# Patient Record
Sex: FEMALE | Race: WHITE | NOT HISPANIC OR LATINO | URBAN - METROPOLITAN AREA
[De-identification: names, ages, dates, MRNs, and addresses within clinical notes are randomized per-mention and may not be internally consistent; named-entity substitution may affect disease eponyms.]

---

## 2018-03-06 ENCOUNTER — OFFICE (OUTPATIENT)
Dept: URBAN - METROPOLITAN AREA CLINIC 10 | Facility: CLINIC | Age: 68
End: 2018-03-06

## 2018-03-06 ENCOUNTER — OFFICE (OUTPATIENT)
Dept: URBAN - METROPOLITAN AREA CLINIC 10 | Facility: CLINIC | Age: 68
End: 2018-03-06
Payer: COMMERCIAL

## 2018-03-06 VITALS
DIASTOLIC BLOOD PRESSURE: 73 MMHG | HEIGHT: 61 IN | WEIGHT: 148 LBS | SYSTOLIC BLOOD PRESSURE: 131 MMHG | HEART RATE: 78 BPM

## 2018-03-06 DIAGNOSIS — K64.8 OTHER HEMORRHOIDS: ICD-10-CM

## 2018-03-06 DIAGNOSIS — K62.89 OTHER SPECIFIED DISEASES OF ANUS AND RECTUM: ICD-10-CM

## 2018-03-06 DIAGNOSIS — K64.4 RESIDUAL HEMORRHOIDAL SKIN TAGS: ICD-10-CM

## 2018-03-06 DIAGNOSIS — R19.7 DIARRHEA, UNSPECIFIED: ICD-10-CM

## 2018-03-06 LAB
CBC, PLATELET, NO DIFFERENTIAL: HEMATOCRIT: 43.2 % (ref 34–46.6)
CBC, PLATELET, NO DIFFERENTIAL: HEMOGLOBIN: 14.5 G/DL (ref 11.1–15.9)
CBC, PLATELET, NO DIFFERENTIAL: MCH: 28.4 PG (ref 26.6–33)
CBC, PLATELET, NO DIFFERENTIAL: MCHC: 33.6 G/DL (ref 31.5–35.7)
CBC, PLATELET, NO DIFFERENTIAL: MCV: 85 FL (ref 79–97)
CBC, PLATELET, NO DIFFERENTIAL: PLATELETS: 278 X10E3/UL (ref 150–379)
CBC, PLATELET, NO DIFFERENTIAL: RBC: 5.1 X10E6/UL (ref 3.77–5.28)
CBC, PLATELET, NO DIFFERENTIAL: RDW: 14 % (ref 12.3–15.4)
CBC, PLATELET, NO DIFFERENTIAL: WBC: 10.3 X10E3/UL (ref 3.4–10.8)
COMP. METABOLIC PANEL (14): A/G RATIO: 1.8 (ref 1.2–2.2)
COMP. METABOLIC PANEL (14): ALBUMIN, SERUM: 4.2 G/DL (ref 3.6–4.8)
COMP. METABOLIC PANEL (14): ALKALINE PHOSPHATASE, S: 69 IU/L (ref 39–117)
COMP. METABOLIC PANEL (14): ALT (SGPT): 9 IU/L (ref 0–32)
COMP. METABOLIC PANEL (14): AST (SGOT): 16 IU/L (ref 0–40)
COMP. METABOLIC PANEL (14): BILIRUBIN, TOTAL: 0.3 MG/DL (ref 0–1.2)
COMP. METABOLIC PANEL (14): BUN/CREATININE RATIO: 16 (ref 12–28)
COMP. METABOLIC PANEL (14): BUN: 14 MG/DL (ref 8–27)
COMP. METABOLIC PANEL (14): CALCIUM, SERUM: 9.3 MG/DL (ref 8.7–10.3)
COMP. METABOLIC PANEL (14): CARBON DIOXIDE, TOTAL: 27 MMOL/L (ref 18–29)
COMP. METABOLIC PANEL (14): CHLORIDE, SERUM: 104 MMOL/L (ref 96–106)
COMP. METABOLIC PANEL (14): CREATININE, SERUM: 0.86 MG/DL (ref 0.57–1)
COMP. METABOLIC PANEL (14): EGFR IF AFRICN AM: 81 ML/MIN/1.73 (ref 59–?)
COMP. METABOLIC PANEL (14): EGFR IF NONAFRICN AM: 70 ML/MIN/1.73 (ref 59–?)
COMP. METABOLIC PANEL (14): GLOBULIN, TOTAL: 2.3 G/DL (ref 1.5–4.5)
COMP. METABOLIC PANEL (14): GLUCOSE, SERUM: 84 MG/DL (ref 65–99)
COMP. METABOLIC PANEL (14): POTASSIUM, SERUM: 4.8 MMOL/L (ref 3.5–5.2)
COMP. METABOLIC PANEL (14): PROTEIN, TOTAL, SERUM: 6.5 G/DL (ref 6–8.5)
COMP. METABOLIC PANEL (14): SODIUM, SERUM: 147 MMOL/L — HIGH (ref 134–144)
GI PROFILE, STOOL, PCR: ADENOVIRUS F 40/41: NOT DETECTED
GI PROFILE, STOOL, PCR: ASTROVIRUS: NOT DETECTED
GI PROFILE, STOOL, PCR: C DIFFICILE TOXIN A/B: NOT DETECTED
GI PROFILE, STOOL, PCR: CAMPYLOBACTER: NOT DETECTED
GI PROFILE, STOOL, PCR: CRYPTOSPORIDIUM: NOT DETECTED
GI PROFILE, STOOL, PCR: CYCLOSPORA CAYETANENSIS: NOT DETECTED
GI PROFILE, STOOL, PCR: E COLI O157: (no result)
GI PROFILE, STOOL, PCR: ENTAMOEBA HISTOLYTICA: NOT DETECTED
GI PROFILE, STOOL, PCR: ENTEROAGGREGATIVE E COLI: NOT DETECTED
GI PROFILE, STOOL, PCR: ENTEROPATHOGENIC E COLI: NOT DETECTED
GI PROFILE, STOOL, PCR: ENTEROTOXIGENIC E COLI: NOT DETECTED
GI PROFILE, STOOL, PCR: GIARDIA LAMBLIA: NOT DETECTED
GI PROFILE, STOOL, PCR: NOROVIRUS GI/GII: NOT DETECTED
GI PROFILE, STOOL, PCR: PLESIOMONAS SHIGELLOIDES: NOT DETECTED
GI PROFILE, STOOL, PCR: ROTAVIRUS A: NOT DETECTED
GI PROFILE, STOOL, PCR: SALMONELLA: NOT DETECTED
GI PROFILE, STOOL, PCR: SAPOVIRUS: NOT DETECTED
GI PROFILE, STOOL, PCR: SHIGA-TOXIN-PRODUCING E COLI: NOT DETECTED
GI PROFILE, STOOL, PCR: SHIGELLA/ENTEROINVASIVE E COLI: NOT DETECTED
GI PROFILE, STOOL, PCR: VIBRIO CHOLERAE: NOT DETECTED
GI PROFILE, STOOL, PCR: VIBRIO: NOT DETECTED
GI PROFILE, STOOL, PCR: YERSINIA ENTEROCOLITICA: NOT DETECTED
SEDIMENTATION RATE-WESTERGREN: 2 MM/HR (ref 0–40)

## 2018-03-06 PROCEDURE — 46600 DIAGNOSTIC ANOSCOPY SPX: CPT | Performed by: INTERNAL MEDICINE

## 2018-03-06 PROCEDURE — 99214 OFFICE O/P EST MOD 30 MIN: CPT | Mod: 25 | Performed by: INTERNAL MEDICINE

## 2018-03-06 RX ORDER — PRAMOXINE HYDROCHLORIDE HYDROCORTISONE ACETATE 100; 100 MG/10G; MG/10G
AEROSOL, FOAM TOPICAL
Qty: 21 | Refills: 1 | Status: ACTIVE
Start: 2018-03-06

## 2018-03-06 RX ORDER — POLYETHYLENE GLYCOL 3350, SODIUM SULFATE ANHYDROUS, SODIUM BICARBONATE, SODIUM CHLORIDE, POTASSIUM CHLORIDE 236; 22.74; 6.74; 5.86; 2.97 G/4L; G/4L; G/4L; G/4L; G/4L
POWDER, FOR SOLUTION ORAL
Qty: 1 | Refills: 0 | Status: COMPLETED
Start: 2018-03-06 | End: 2018-03-20

## 2018-03-20 ENCOUNTER — AMBULATORY SURGICAL CENTER (OUTPATIENT)
Dept: URBAN - METROPOLITAN AREA SURGERY 1 | Facility: SURGERY | Age: 68
End: 2018-03-20
Payer: COMMERCIAL

## 2018-03-20 ENCOUNTER — OFFICE (OUTPATIENT)
Dept: URBAN - METROPOLITAN AREA PATHOLOGY 22 | Facility: PATHOLOGY | Age: 68
End: 2018-03-20
Payer: COMMERCIAL

## 2018-03-20 VITALS
HEART RATE: 80 BPM | WEIGHT: 142 LBS | DIASTOLIC BLOOD PRESSURE: 82 MMHG | SYSTOLIC BLOOD PRESSURE: 153 MMHG | DIASTOLIC BLOOD PRESSURE: 56 MMHG | WEIGHT: 142 LBS | HEIGHT: 61 IN | SYSTOLIC BLOOD PRESSURE: 119 MMHG | HEART RATE: 76 BPM | OXYGEN SATURATION: 98 % | HEART RATE: 91 BPM | HEART RATE: 73 BPM | HEART RATE: 73 BPM | SYSTOLIC BLOOD PRESSURE: 153 MMHG | SYSTOLIC BLOOD PRESSURE: 133 MMHG | DIASTOLIC BLOOD PRESSURE: 77 MMHG | OXYGEN SATURATION: 98 % | RESPIRATION RATE: 16 BRPM | OXYGEN SATURATION: 96 % | DIASTOLIC BLOOD PRESSURE: 56 MMHG | DIASTOLIC BLOOD PRESSURE: 77 MMHG | OXYGEN SATURATION: 96 % | HEART RATE: 80 BPM | TEMPERATURE: 97.8 F | TEMPERATURE: 98.8 F | HEIGHT: 61 IN | SYSTOLIC BLOOD PRESSURE: 133 MMHG | HEART RATE: 74 BPM | RESPIRATION RATE: 18 BRPM | DIASTOLIC BLOOD PRESSURE: 82 MMHG | DIASTOLIC BLOOD PRESSURE: 76 MMHG | TEMPERATURE: 98.8 F | RESPIRATION RATE: 16 BRPM | DIASTOLIC BLOOD PRESSURE: 76 MMHG | SYSTOLIC BLOOD PRESSURE: 152 MMHG | SYSTOLIC BLOOD PRESSURE: 119 MMHG | HEART RATE: 74 BPM | SYSTOLIC BLOOD PRESSURE: 152 MMHG | HEART RATE: 91 BPM | HEART RATE: 76 BPM | RESPIRATION RATE: 18 BRPM | TEMPERATURE: 97.8 F

## 2018-03-20 DIAGNOSIS — R93.3 ABNORMAL FINDINGS ON DIAGNOSTIC IMAGING OF OTHER PARTS OF DI: ICD-10-CM

## 2018-03-20 DIAGNOSIS — K92.2 GASTROINTESTINAL HEMORRHAGE, UNSPECIFIED: ICD-10-CM

## 2018-03-20 DIAGNOSIS — K57.30 DIVERTICULOSIS OF LARGE INTESTINE WITHOUT PERFORATION OR ABS: ICD-10-CM

## 2018-03-20 DIAGNOSIS — K63.5 POLYP OF COLON: ICD-10-CM

## 2018-03-20 DIAGNOSIS — K52.9 NONINFECTIVE GASTROENTERITIS AND COLITIS, UNSPECIFIED: ICD-10-CM

## 2018-03-20 PROBLEM — D12.5 BENIGN NEOPLASM OF SIGMOID COLON: Status: ACTIVE | Noted: 2018-03-20

## 2018-03-20 PROBLEM — D12.3 BENIGN NEOPLASM OF TRANSVERSE COLON: Status: ACTIVE | Noted: 2018-03-20

## 2018-03-20 PROCEDURE — 45380 COLONOSCOPY AND BIOPSY: CPT | Performed by: INTERNAL MEDICINE

## 2018-03-20 PROCEDURE — 88342 IMHCHEM/IMCYTCHM 1ST ANTB: CPT | Performed by: INTERNAL MEDICINE

## 2018-03-20 PROCEDURE — 88305 TISSUE EXAM BY PATHOLOGIST: CPT | Performed by: INTERNAL MEDICINE

## 2018-03-20 PROCEDURE — G8907 PT DOC NO EVENTS ON DISCHARG: HCPCS | Performed by: INTERNAL MEDICINE

## 2018-03-20 PROCEDURE — G8918 PT W/O PREOP ORDER IV AB PRO: HCPCS | Performed by: INTERNAL MEDICINE

## 2018-03-20 RX ORDER — MESALAMINE 375 MG/1
CAPSULE, EXTENDED RELEASE ORAL
Qty: 120 | Refills: 6 | Status: ACTIVE
Start: 2018-03-20

## 2018-03-28 ENCOUNTER — OFFICE (OUTPATIENT)
Dept: URBAN - METROPOLITAN AREA CLINIC 10 | Facility: CLINIC | Age: 68
End: 2018-03-28
Payer: COMMERCIAL

## 2018-03-28 VITALS
HEIGHT: 61 IN | WEIGHT: 143 LBS | DIASTOLIC BLOOD PRESSURE: 70 MMHG | SYSTOLIC BLOOD PRESSURE: 118 MMHG | HEART RATE: 100 BPM

## 2018-03-28 DIAGNOSIS — K51.311 ULCERATIVE (CHRONIC) RECTOSIGMOIDITIS WITH RECTAL BLEEDING: ICD-10-CM

## 2018-03-28 DIAGNOSIS — A09 INFECTIOUS GASTROENTERITIS AND COLITIS, UNSPECIFIED: ICD-10-CM

## 2018-03-28 LAB
C-REACTIVE PROTEIN, QUANT: 83.4 MG/L — HIGH (ref 0–4.9)
CBC WITH DIFFERENTIAL/PLATELET: BASO (ABSOLUTE): 0 X10E3/UL (ref 0–0.2)
CBC WITH DIFFERENTIAL/PLATELET: BASOS: 0 %
CBC WITH DIFFERENTIAL/PLATELET: EOS (ABSOLUTE): 0.4 X10E3/UL (ref 0–0.4)
CBC WITH DIFFERENTIAL/PLATELET: EOS: 5 %
CBC WITH DIFFERENTIAL/PLATELET: HEMATOCRIT: 41.5 % (ref 34–46.6)
CBC WITH DIFFERENTIAL/PLATELET: HEMOGLOBIN: 13.9 G/DL (ref 11.1–15.9)
CBC WITH DIFFERENTIAL/PLATELET: IMMATURE GRANS (ABS): 0 X10E3/UL (ref 0–0.1)
CBC WITH DIFFERENTIAL/PLATELET: IMMATURE GRANULOCYTES: 0 %
CBC WITH DIFFERENTIAL/PLATELET: LYMPHS (ABSOLUTE): 0.7 X10E3/UL (ref 0.7–3.1)
CBC WITH DIFFERENTIAL/PLATELET: LYMPHS: 9 %
CBC WITH DIFFERENTIAL/PLATELET: MCH: 27.7 PG (ref 26.6–33)
CBC WITH DIFFERENTIAL/PLATELET: MCHC: 33.5 G/DL (ref 31.5–35.7)
CBC WITH DIFFERENTIAL/PLATELET: MCV: 83 FL (ref 79–97)
CBC WITH DIFFERENTIAL/PLATELET: MONOCYTES(ABSOLUTE): 1 X10E3/UL — HIGH (ref 0.1–0.9)
CBC WITH DIFFERENTIAL/PLATELET: MONOCYTES: 14 %
CBC WITH DIFFERENTIAL/PLATELET: NEUTROPHILS (ABSOLUTE): 5.2 X10E3/UL (ref 1.4–7)
CBC WITH DIFFERENTIAL/PLATELET: NEUTROPHILS: 72 %
CBC WITH DIFFERENTIAL/PLATELET: PLATELETS: 253 X10E3/UL (ref 150–379)
CBC WITH DIFFERENTIAL/PLATELET: RBC: 5.01 X10E6/UL (ref 3.77–5.28)
CBC WITH DIFFERENTIAL/PLATELET: RDW: 14.5 % (ref 12.3–15.4)
CBC WITH DIFFERENTIAL/PLATELET: WBC: 7.3 X10E3/UL (ref 3.4–10.8)

## 2018-03-28 PROCEDURE — 99214 OFFICE O/P EST MOD 30 MIN: CPT | Performed by: INTERNAL MEDICINE

## 2018-03-28 RX ORDER — BALSALAZIDE DISODIUM 750 MG/1
CAPSULE ORAL
Qty: 270 | Refills: 3 | Status: ACTIVE
Start: 2018-03-28

## 2018-03-28 RX ORDER — HYDROCODONE BITARTRATE AND ACETAMINOPHEN 7.5; 325 MG/1; MG/1
TABLET ORAL
Qty: 30 | Refills: 0 | Status: ACTIVE
Start: 2018-03-28

## 2018-04-04 ENCOUNTER — INPATIENT HOSPITAL (OUTPATIENT)
Dept: URBAN - METROPOLITAN AREA HOSPITAL 93 | Facility: HOSPITAL | Age: 68
End: 2018-04-04
Payer: COMMERCIAL

## 2018-04-04 DIAGNOSIS — K92.1 MELENA: ICD-10-CM

## 2018-04-04 DIAGNOSIS — R10.30 LOWER ABDOMINAL PAIN, UNSPECIFIED: ICD-10-CM

## 2018-04-04 DIAGNOSIS — K51.911 ULCERATIVE COLITIS, UNSPECIFIED WITH RECTAL BLEEDING: ICD-10-CM

## 2018-04-04 PROCEDURE — 99222 1ST HOSP IP/OBS MODERATE 55: CPT | Performed by: INTERNAL MEDICINE

## 2018-04-05 ENCOUNTER — INPATIENT HOSPITAL (OUTPATIENT)
Dept: URBAN - METROPOLITAN AREA HOSPITAL 93 | Facility: HOSPITAL | Age: 68
End: 2018-04-05
Payer: COMMERCIAL

## 2018-04-05 DIAGNOSIS — K92.1 MELENA: ICD-10-CM

## 2018-04-05 DIAGNOSIS — R10.30 LOWER ABDOMINAL PAIN, UNSPECIFIED: ICD-10-CM

## 2018-04-05 DIAGNOSIS — K51.911 ULCERATIVE COLITIS, UNSPECIFIED WITH RECTAL BLEEDING: ICD-10-CM

## 2018-04-05 PROCEDURE — 99232 SBSQ HOSP IP/OBS MODERATE 35: CPT | Performed by: INTERNAL MEDICINE

## 2018-04-06 PROCEDURE — 99232 SBSQ HOSP IP/OBS MODERATE 35: CPT | Performed by: INTERNAL MEDICINE

## 2018-04-07 ENCOUNTER — INPATIENT HOSPITAL (OUTPATIENT)
Dept: URBAN - METROPOLITAN AREA HOSPITAL 93 | Facility: HOSPITAL | Age: 68
End: 2018-04-07
Payer: COMMERCIAL

## 2018-04-07 DIAGNOSIS — K92.1 MELENA: ICD-10-CM

## 2018-04-07 DIAGNOSIS — K51.911 ULCERATIVE COLITIS, UNSPECIFIED WITH RECTAL BLEEDING: ICD-10-CM

## 2018-04-07 DIAGNOSIS — R10.30 LOWER ABDOMINAL PAIN, UNSPECIFIED: ICD-10-CM

## 2018-04-07 PROCEDURE — 99231 SBSQ HOSP IP/OBS SF/LOW 25: CPT | Performed by: INTERNAL MEDICINE

## 2018-04-08 ENCOUNTER — INPATIENT HOSPITAL (OUTPATIENT)
Dept: URBAN - METROPOLITAN AREA HOSPITAL 93 | Facility: HOSPITAL | Age: 68
End: 2018-04-08
Payer: COMMERCIAL

## 2018-04-08 DIAGNOSIS — K51.911 ULCERATIVE COLITIS, UNSPECIFIED WITH RECTAL BLEEDING: ICD-10-CM

## 2018-04-08 DIAGNOSIS — K92.1 MELENA: ICD-10-CM

## 2018-04-08 DIAGNOSIS — R10.30 LOWER ABDOMINAL PAIN, UNSPECIFIED: ICD-10-CM

## 2018-04-08 PROCEDURE — 99231 SBSQ HOSP IP/OBS SF/LOW 25: CPT | Performed by: INTERNAL MEDICINE

## 2018-04-09 ENCOUNTER — INPATIENT HOSPITAL (OUTPATIENT)
Dept: URBAN - METROPOLITAN AREA HOSPITAL 93 | Facility: HOSPITAL | Age: 68
End: 2018-04-09
Payer: COMMERCIAL

## 2018-04-09 DIAGNOSIS — K92.1 MELENA: ICD-10-CM

## 2018-04-09 DIAGNOSIS — K51.911 ULCERATIVE COLITIS, UNSPECIFIED WITH RECTAL BLEEDING: ICD-10-CM

## 2018-04-09 DIAGNOSIS — R10.30 LOWER ABDOMINAL PAIN, UNSPECIFIED: ICD-10-CM

## 2018-04-09 PROCEDURE — 99232 SBSQ HOSP IP/OBS MODERATE 35: CPT | Performed by: INTERNAL MEDICINE

## 2018-04-10 PROCEDURE — 99232 SBSQ HOSP IP/OBS MODERATE 35: CPT | Performed by: INTERNAL MEDICINE

## 2018-04-11 ENCOUNTER — INPATIENT HOSPITAL (OUTPATIENT)
Dept: URBAN - METROPOLITAN AREA HOSPITAL 93 | Facility: HOSPITAL | Age: 68
End: 2018-04-11
Payer: COMMERCIAL

## 2018-04-11 DIAGNOSIS — R10.30 LOWER ABDOMINAL PAIN, UNSPECIFIED: ICD-10-CM

## 2018-04-11 DIAGNOSIS — K51.911 ULCERATIVE COLITIS, UNSPECIFIED WITH RECTAL BLEEDING: ICD-10-CM

## 2018-04-11 DIAGNOSIS — K92.1 MELENA: ICD-10-CM

## 2018-04-11 PROCEDURE — 99232 SBSQ HOSP IP/OBS MODERATE 35: CPT | Performed by: INTERNAL MEDICINE

## 2018-04-19 ENCOUNTER — OFFICE (OUTPATIENT)
Dept: URBAN - METROPOLITAN AREA CLINIC 10 | Facility: CLINIC | Age: 68
End: 2018-04-19
Payer: COMMERCIAL

## 2018-04-19 VITALS
DIASTOLIC BLOOD PRESSURE: 73 MMHG | HEART RATE: 75 BPM | SYSTOLIC BLOOD PRESSURE: 146 MMHG | WEIGHT: 130 LBS | HEIGHT: 61 IN

## 2018-04-19 DIAGNOSIS — K51.311 ULCERATIVE (CHRONIC) RECTOSIGMOIDITIS WITH RECTAL BLEEDING: ICD-10-CM

## 2018-04-19 LAB
CBC, PLATELET, NO DIFFERENTIAL: HEMATOCRIT: 37.6 % (ref 34–46.6)
CBC, PLATELET, NO DIFFERENTIAL: HEMOGLOBIN: 12.3 G/DL (ref 11.1–15.9)
CBC, PLATELET, NO DIFFERENTIAL: MCH: 27.2 PG (ref 26.6–33)
CBC, PLATELET, NO DIFFERENTIAL: MCHC: 32.7 G/DL (ref 31.5–35.7)
CBC, PLATELET, NO DIFFERENTIAL: MCV: 83 FL (ref 79–97)
CBC, PLATELET, NO DIFFERENTIAL: PLATELETS: 372 X10E3/UL (ref 150–379)
CBC, PLATELET, NO DIFFERENTIAL: RBC: 4.52 X10E6/UL (ref 3.77–5.28)
CBC, PLATELET, NO DIFFERENTIAL: RDW: 16 % — HIGH (ref 12.3–15.4)
CBC, PLATELET, NO DIFFERENTIAL: WBC: 10 X10E3/UL (ref 3.4–10.8)
COMP. METABOLIC PANEL (14): A/G RATIO: 1.8 (ref 1.2–2.2)
COMP. METABOLIC PANEL (14): ALBUMIN: 3.5 G/DL — LOW (ref 3.6–4.8)
COMP. METABOLIC PANEL (14): ALKALINE PHOSPHATASE: 57 IU/L (ref 39–117)
COMP. METABOLIC PANEL (14): ALT (SGPT): 9 IU/L (ref 0–32)
COMP. METABOLIC PANEL (14): AST (SGOT): 18 IU/L (ref 0–40)
COMP. METABOLIC PANEL (14): BILIRUBIN, TOTAL: 0.5 MG/DL (ref 0–1.2)
COMP. METABOLIC PANEL (14): BUN/CREATININE RATIO: 20 (ref 12–28)
COMP. METABOLIC PANEL (14): BUN: 14 MG/DL (ref 8–27)
COMP. METABOLIC PANEL (14): CALCIUM: 8.6 MG/DL — LOW (ref 8.7–10.3)
COMP. METABOLIC PANEL (14): CARBON DIOXIDE, TOTAL: 33 MMOL/L — HIGH (ref 18–29)
COMP. METABOLIC PANEL (14): CHLORIDE: 96 MMOL/L (ref 96–106)
COMP. METABOLIC PANEL (14): CREATININE: 0.69 MG/DL (ref 0.57–1)
COMP. METABOLIC PANEL (14): EGFR IF AFRICN AM: 104 ML/MIN/1.73 (ref 59–?)
COMP. METABOLIC PANEL (14): EGFR IF NONAFRICN AM: 90 ML/MIN/1.73 (ref 59–?)
COMP. METABOLIC PANEL (14): GLOBULIN, TOTAL: 2 G/DL (ref 1.5–4.5)
COMP. METABOLIC PANEL (14): GLUCOSE: 109 MG/DL — HIGH (ref 65–99)
COMP. METABOLIC PANEL (14): POTASSIUM: 3.5 MMOL/L (ref 3.5–5.2)
COMP. METABOLIC PANEL (14): PROTEIN, TOTAL: 5.5 G/DL — LOW (ref 6–8.5)
COMP. METABOLIC PANEL (14): SODIUM: 146 MMOL/L — HIGH (ref 134–144)
HBSAG SCREEN: NEGATIVE
HCV ANTIBODY: HEP C VIRUS AB: <0.1 S/CO RATIO
HEP A AB, TOTAL: NEGATIVE
HEP B CORE AB, TOT: NEGATIVE
HEP B SURFACE AB: HEP B SURFACE AB, QUAL: NON REACTIVE
QUANTIFERON IN TUBE: INTERPRETATION: (no result)
QUANTIFERON IN TUBE: QFT TB AG MINUS NIL VALUE: <0 IU/ML
QUANTIFERON IN TUBE: QUANTIFERON CRITERIA: (no result)
QUANTIFERON IN TUBE: QUANTIFERON MITOGEN VALUE: 0.17 IU/ML
QUANTIFERON IN TUBE: QUANTIFERON NIL VALUE: 0.13 IU/ML
QUANTIFERON IN TUBE: QUANTIFERON TB AG VALUE: 0.11 IU/ML
QUANTIFERON IN TUBE: QUANTIFERON TB GOLD: ABNORMAL
QUANTIFERON TB GOLD (IN TUBE): QUANTIFERON INCUBATION: (no result)

## 2018-04-19 PROCEDURE — 99213 OFFICE O/P EST LOW 20 MIN: CPT | Performed by: INTERNAL MEDICINE

## 2018-04-19 RX ORDER — MESALAMINE 4 G/60ML
ENEMA RECTAL
Qty: 30 | Refills: 3 | Status: COMPLETED
Start: 2018-04-19 | End: 2018-05-30

## 2018-04-19 NOTE — SERVICEHPINOTES
Ms. Lr is 67 years old. She is here for follow up what appears to be left-sided ulcerative colitis. She has been hospitalized since last visit. She is currently on prednisone 30 mg once daily. She complains of fatigue. She has good days and bad. She states she feels tired today as she was up most of the night last night with increased stool frequency. She states her day-time stool activity is better without blood but will become bloody in the evening. She denies increase blood with enema use. She is using hydrocortisone enema. She feels better with hydrocort enema. She is on tapering dose of oral prednisone as well balsalazide. 03/20/2018-colonoscopy: Sigmoid, descending, transverse, ascending colon diverticulosis. Beginning at the rectum that stenting to approximately 23 cm was noted to be circumferential granular, edematous mucosa with increased friability and erosions. Exudate was noted. The greatest involvement appeared to be the sigmoid colon. Remainder of the colon appeared to be relatively normal other than scattered aphthous appearing ulcers throughout the remainder of the colon. Transverse colon biopsies demonstrated 1 of 4 fragments demonstrating acute colitis with remainder being normal. Descending colon biopsies normal. Rectum and sigmoid biopsies demonstrating mild to moderately active colitis characteristic of chronic idiopathic inflammatory bowel disease. There are also small patches of pseudomembrane.03/06/2018-molecular stool pathogen panel negativeBR-CMP and CBC normal. Sed rate [id number]/2015-EGD for dysphagia: Normal with negative biopsies for eosinophilic esophagitis.-colonoscopy diverticulosis coli. Internal and external hemorrhoids.5/2015- Review of records from Dr. Childers show normal HCT, PLT and MCV. CMP normal. HCV AB negative

## 2018-05-05 ENCOUNTER — INPATIENT HOSPITAL (OUTPATIENT)
Dept: URBAN - METROPOLITAN AREA HOSPITAL 93 | Facility: HOSPITAL | Age: 68
End: 2018-05-05
Payer: COMMERCIAL

## 2018-05-05 DIAGNOSIS — R10.30 LOWER ABDOMINAL PAIN, UNSPECIFIED: ICD-10-CM

## 2018-05-05 DIAGNOSIS — K63.5 POLYP OF COLON: ICD-10-CM

## 2018-05-05 DIAGNOSIS — A04.72 ENTEROCOLITIS DUE TO CLOSTRIDIUM DIFFICILE, NOT SPECIFIED AS: ICD-10-CM

## 2018-05-05 DIAGNOSIS — K51.911 ULCERATIVE COLITIS, UNSPECIFIED WITH RECTAL BLEEDING: ICD-10-CM

## 2018-05-05 DIAGNOSIS — K92.1 MELENA: ICD-10-CM

## 2018-05-05 PROCEDURE — 99222 1ST HOSP IP/OBS MODERATE 55: CPT | Performed by: INTERNAL MEDICINE

## 2018-05-06 PROCEDURE — 99231 SBSQ HOSP IP/OBS SF/LOW 25: CPT | Performed by: INTERNAL MEDICINE

## 2018-05-07 PROCEDURE — 99231 SBSQ HOSP IP/OBS SF/LOW 25: CPT | Performed by: INTERNAL MEDICINE

## 2018-05-08 PROCEDURE — 45331 SIGMOIDOSCOPY AND BIOPSY: CPT | Performed by: INTERNAL MEDICINE

## 2018-05-09 PROCEDURE — 99231 SBSQ HOSP IP/OBS SF/LOW 25: CPT | Performed by: INTERNAL MEDICINE

## 2018-05-30 ENCOUNTER — OFFICE (OUTPATIENT)
Dept: URBAN - METROPOLITAN AREA CLINIC 10 | Facility: CLINIC | Age: 68
End: 2018-05-30
Payer: COMMERCIAL

## 2018-05-30 ENCOUNTER — INPATIENT HOSPITAL (OUTPATIENT)
Dept: URBAN - METROPOLITAN AREA HOSPITAL 93 | Facility: HOSPITAL | Age: 68
End: 2018-05-30
Payer: COMMERCIAL

## 2018-05-30 VITALS
HEART RATE: 90 BPM | DIASTOLIC BLOOD PRESSURE: 78 MMHG | HEIGHT: 61 IN | WEIGHT: 121 LBS | SYSTOLIC BLOOD PRESSURE: 130 MMHG

## 2018-05-30 DIAGNOSIS — R19.7 DIARRHEA, UNSPECIFIED: ICD-10-CM

## 2018-05-30 DIAGNOSIS — K51.311 ULCERATIVE (CHRONIC) RECTOSIGMOIDITIS WITH RECTAL BLEEDING: ICD-10-CM

## 2018-05-30 DIAGNOSIS — R10.30 LOWER ABDOMINAL PAIN, UNSPECIFIED: ICD-10-CM

## 2018-05-30 DIAGNOSIS — A04.72 ENTEROCOLITIS DUE TO CLOSTRIDIUM DIFFICILE, NOT SPECIFIED AS: ICD-10-CM

## 2018-05-30 DIAGNOSIS — K92.1 MELENA: ICD-10-CM

## 2018-05-30 DIAGNOSIS — K51.911 ULCERATIVE COLITIS, UNSPECIFIED WITH RECTAL BLEEDING: ICD-10-CM

## 2018-05-30 PROCEDURE — 99223 1ST HOSP IP/OBS HIGH 75: CPT | Performed by: INTERNAL MEDICINE

## 2018-05-30 PROCEDURE — 99214 OFFICE O/P EST MOD 30 MIN: CPT | Performed by: INTERNAL MEDICINE

## 2018-05-31 PROCEDURE — 99232 SBSQ HOSP IP/OBS MODERATE 35: CPT | Performed by: INTERNAL MEDICINE

## 2018-06-01 ENCOUNTER — INPATIENT HOSPITAL (OUTPATIENT)
Dept: URBAN - METROPOLITAN AREA HOSPITAL 93 | Facility: HOSPITAL | Age: 68
End: 2018-06-01
Payer: COMMERCIAL

## 2018-06-01 DIAGNOSIS — K51.911 ULCERATIVE COLITIS, UNSPECIFIED WITH RECTAL BLEEDING: ICD-10-CM

## 2018-06-01 DIAGNOSIS — A04.72 ENTEROCOLITIS DUE TO CLOSTRIDIUM DIFFICILE, NOT SPECIFIED AS: ICD-10-CM

## 2018-06-01 DIAGNOSIS — K52.89 OTHER SPECIFIED NONINFECTIVE GASTROENTERITIS AND COLITIS: ICD-10-CM

## 2018-06-01 DIAGNOSIS — K92.1 MELENA: ICD-10-CM

## 2018-06-01 DIAGNOSIS — R10.30 LOWER ABDOMINAL PAIN, UNSPECIFIED: ICD-10-CM

## 2018-06-01 PROCEDURE — 45331 SIGMOIDOSCOPY AND BIOPSY: CPT | Performed by: INTERNAL MEDICINE

## 2018-06-02 ENCOUNTER — INPATIENT HOSPITAL (OUTPATIENT)
Dept: URBAN - METROPOLITAN AREA HOSPITAL 130 | Facility: HOSPITAL | Age: 68
End: 2018-06-02
Payer: COMMERCIAL

## 2018-06-02 DIAGNOSIS — R10.30 LOWER ABDOMINAL PAIN, UNSPECIFIED: ICD-10-CM

## 2018-06-02 DIAGNOSIS — K92.1 MELENA: ICD-10-CM

## 2018-06-02 DIAGNOSIS — A04.72 ENTEROCOLITIS DUE TO CLOSTRIDIUM DIFFICILE, NOT SPECIFIED AS: ICD-10-CM

## 2018-06-02 DIAGNOSIS — K51.911 ULCERATIVE COLITIS, UNSPECIFIED WITH RECTAL BLEEDING: ICD-10-CM

## 2018-06-02 PROCEDURE — 99231 SBSQ HOSP IP/OBS SF/LOW 25: CPT | Performed by: INTERNAL MEDICINE
